# Patient Record
Sex: MALE | Race: WHITE | HISPANIC OR LATINO | Employment: FULL TIME | URBAN - METROPOLITAN AREA
[De-identification: names, ages, dates, MRNs, and addresses within clinical notes are randomized per-mention and may not be internally consistent; named-entity substitution may affect disease eponyms.]

---

## 2019-02-08 ENCOUNTER — HOSPITAL ENCOUNTER (EMERGENCY)
Facility: CLINIC | Age: 74
Discharge: HOME | End: 2019-02-08
Attending: EMERGENCY MEDICINE | Admitting: EMERGENCY MEDICINE
Payer: MEDICARE

## 2019-02-08 VITALS
DIASTOLIC BLOOD PRESSURE: 78 MMHG | HEART RATE: 68 BPM | RESPIRATION RATE: 16 BRPM | SYSTOLIC BLOOD PRESSURE: 124 MMHG | OXYGEN SATURATION: 97 % | TEMPERATURE: 97.6 F

## 2019-02-08 DIAGNOSIS — R21 RASH: ICD-10-CM

## 2019-02-08 PROCEDURE — 99282 EMERGENCY DEPT VISIT SF MDM: CPT

## 2019-02-08 RX ORDER — CETIRIZINE HYDROCHLORIDE 10 MG/1
10 TABLET ORAL
COMMUNITY
Start: 2018-12-13

## 2019-02-08 RX ORDER — MUPIROCIN 20 MG/G
OINTMENT TOPICAL 3 TIMES DAILY
Qty: 1 G | Refills: 0 | Status: SHIPPED | OUTPATIENT
Start: 2019-02-08 | End: 2019-02-15

## 2019-02-08 RX ORDER — DIPHENHYDRAMINE HCL 25 MG
25 CAPSULE ORAL EVERY 6 HOURS PRN
Qty: 20 CAPSULE | Refills: 0 | Status: SHIPPED | OUTPATIENT
Start: 2019-02-08 | End: 2019-03-10

## 2019-02-08 RX ORDER — ATORVASTATIN CALCIUM 20 MG/1
20 TABLET, FILM COATED ORAL
COMMUNITY
Start: 2018-08-09 | End: 2019-08-09

## 2019-02-08 ASSESSMENT — ENCOUNTER SYMPTOMS: FEVER: 1

## 2019-02-08 NOTE — ED PROVIDER NOTES
History     Chief Complaint:  Rash    HPI   Buddy Valenzuela is a 73 year old male who presents with rash. The patient states that he has a rash on his face with itching that started Monday. He states that the rash has been spreading more quickly since yesterday. The patient states that the rash is bilateral and are little spots. He states that since Monday more little spots have been popping up on his face. He states that the rash is not anywhere else on his body. The patient states that yesterday he tried to clean his face with a new soap and lotion. He states that the lotion and soap did not make the rash worse. He states he put alcohol on his face this morning to clean it off. The patient denies any new medication. The patient denies any injections when he went to the dentist. The patient denies any drainage of fluid from the rash. The patient also notes he had a low grade fever this morning though never took his temperature. The patient denies tongue or lip swelling. The patient states that his son whom he lives with does not have a rash.     Allergies:  No Known Allergies     Medications:    Finasteride     Past Medical History:    BPH  Gallstone     Past Surgical History:    Appendectomy  Hernia repair  Laparoscopic cholecystectomy     Family History:    The patient denies any relevant family history, including a family history of early onset heart disease, PE, or DVT.    Social History:  Smoking Status: former smoker  Alcohol Use: No  Marital Status:        Review of Systems   Constitutional: Positive for fever.   HENT:        No tongue or lip swelling   Skin: Positive for rash (no fluid drainage).   All other systems reviewed and are negative.    Physical Exam     Patient Vitals for the past 24 hrs:   BP Temp Temp src Pulse Resp SpO2   02/08/19 0911 (!) 163/92 97.6  F (36.4  C) Oral 73 16 96 %       Physical Exam    General: The patient appears comfortable sitting on bed  Head:  The scalp,  face, and head appear normal  Eyes:  The pupils are equal and round    Conjunctivae and sclerae are normal  ENT:    Several erythematous papules on bilateral cheeks, no drainage, skin appears slightly dry, no intra-oral lesions  Neck:  Normal range of motion  CV:  Regular rate and underlying rhythm     Normal S1 and S2  Resp:  Non labored breathing on room air  MS:  Moving all extremities equally  Skin:  See HEENT exam above  Neuro: Speech is normal and fluent    Awake and alert    Face symmetric    Gait stable  Emergency Department Course   Emergency Department Course:    0908 Nursing notes and vitals reviewed.    0911 I performed an exam of the patient as documented above. I personally answered all related questions prior to discharge    Impression & Plan      Medical Decision Making:  Buddy Valenzuela is a 73 year old male presents today with concern of a rash.  The rash is blanching and non-petechial, non-pruritic.  The patient is nontoxic and well-appearing.  I suspect this is a rash may be a dermatitis, eczema given history of eczema per chart review but will treat as possible folliculitis as well with topical mupirocin. Can use benadryl for itching. Less consistent with hives. No evidence of anaphylaxis. Symptomatic care is recommended as needed per discharge instructions.  Close follow-up with the patient's PMD was recommended.  I also recommended return if any worsening, high fever, sores in the mouth, difficulty breathing or any worsening.      Diagnosis:    ICD-10-CM    1. Rash R21         Disposition:   The patient is discharged to home.    Discharge Medications:     START taking      Dose / Directions   diphenhydrAMINE 25 MG capsule  Commonly known as:  BENADRYL      Dose:  25 mg  Take 1 capsule (25 mg) by mouth every 6 hours as needed for itching or allergies  Quantity:  20 capsule  Refills:  0     mupirocin 2 % external ointment  Commonly known as:  BACTROBAN      Apply topically 3 times daily for  7 days  Quantity:  1 g  Refills:  0           Where to get your medicines      Some of these will need a paper prescription and others can be bought over the counter. Ask your nurse if you have questions.    Bring a paper prescription for each of these medications    diphenhydrAMINE 25 MG capsule    mupirocin 2 % external ointment         Scribe Disclosure:  I, Amiwilliam Thibodeaux, am serving as a scribe at 9:11 AM on 2/8/2019 to document services personally performed by Shayy Goode MD based on my observations and the provider's statements to me.  [facility] EMERGENCY DEPARTMENT       Shayy Goode MD  02/08/19 2801

## 2019-02-08 NOTE — ED AVS SNAPSHOT
[facility] Department  [address]  [facility] [address]  Phone:  [phone]  Fax:  [phone]                                    Buddy Valenzuela   MRN: [id number]    Department:  [facility] Emergency Department   Date of Visit:  2/8/2019           After Visit Summary Signature Page    I have received my discharge instructions, and my questions have been answered. I have discussed any challenges I see with this plan with the nurse or doctor.    ..........................................................................................................................................  Patient/Patient Representative Signature      ..........................................................................................................................................  Patient Representative Print Name and Relationship to Patient    ..................................................               ................................................  Date                                   Time    ..........................................................................................................................................  Reviewed by Signature/Title    ...................................................              ..............................................  Date                                               Time          22EPIC Rev 08/18

## 2019-02-08 NOTE — DISCHARGE INSTRUCTIONS
Use benadryl for itching  Use the mupirocin which is an antibiotic cream on cheeks  Follow up with primary care provider if not improving

## 2019-09-10 ENCOUNTER — HOSPITAL ENCOUNTER (EMERGENCY)
Facility: CLINIC | Age: 74
End: 2019-09-10
Payer: MEDICARE

## 2020-01-27 ENCOUNTER — APPOINTMENT (OUTPATIENT)
Dept: INTERPRETER SERVICES | Facility: CLINIC | Age: 75
End: 2020-01-27
Payer: MEDICARE

## 2023-08-01 ENCOUNTER — APPOINTMENT (OUTPATIENT)
Dept: GENERAL RADIOLOGY | Facility: CLINIC | Age: 78
End: 2023-08-01
Attending: EMERGENCY MEDICINE
Payer: COMMERCIAL

## 2023-08-01 ENCOUNTER — HOSPITAL ENCOUNTER (EMERGENCY)
Facility: CLINIC | Age: 78
Discharge: HOME | End: 2023-08-02
Attending: EMERGENCY MEDICINE | Admitting: EMERGENCY MEDICINE
Payer: COMMERCIAL

## 2023-08-01 DIAGNOSIS — R10.13 EPIGASTRIC PAIN: ICD-10-CM

## 2023-08-01 DIAGNOSIS — K29.00 ACUTE GASTRITIS WITHOUT HEMORRHAGE, UNSPECIFIED GASTRITIS TYPE: ICD-10-CM

## 2023-08-01 DIAGNOSIS — K59.00 CONSTIPATION, UNSPECIFIED CONSTIPATION TYPE: ICD-10-CM

## 2023-08-01 DIAGNOSIS — M72.2 PLANTAR FASCIITIS: ICD-10-CM

## 2023-08-01 LAB
ALBUMIN SERPL BCG-MCNC: 4 G/DL (ref 3.5–5.2)
ALBUMIN UR-MCNC: 100 MG/DL
ALP SERPL-CCNC: 87 U/L (ref 40–129)
ALT SERPL W P-5'-P-CCNC: 58 U/L (ref 0–70)
ANION GAP SERPL CALCULATED.3IONS-SCNC: 10 MMOL/L (ref 7–15)
APPEARANCE UR: CLEAR
AST SERPL W P-5'-P-CCNC: 65 U/L (ref 0–45)
BASOPHILS # BLD AUTO: 0.2 10E3/UL (ref 0–0.2)
BASOPHILS NFR BLD AUTO: 2 %
BILIRUB SERPL-MCNC: 0.4 MG/DL
BILIRUB UR QL STRIP: NEGATIVE
BUN SERPL-MCNC: 21.1 MG/DL (ref 8–23)
CALCIUM SERPL-MCNC: 9.1 MG/DL (ref 8.8–10.2)
CHLORIDE SERPL-SCNC: 104 MMOL/L (ref 98–107)
COLOR UR AUTO: YELLOW
CREAT SERPL-MCNC: 1.42 MG/DL (ref 0.67–1.17)
DEPRECATED HCO3 PLAS-SCNC: 26 MMOL/L (ref 22–29)
EOSINOPHIL # BLD AUTO: 0.6 10E3/UL (ref 0–0.7)
EOSINOPHIL NFR BLD AUTO: 4 %
ERYTHROCYTE [DISTWIDTH] IN BLOOD BY AUTOMATED COUNT: 20.9 % (ref 10–15)
GFR SERPL CREATININE-BSD FRML MDRD: 51 ML/MIN/1.73M2
GLUCOSE SERPL-MCNC: 95 MG/DL (ref 70–99)
GLUCOSE UR STRIP-MCNC: NEGATIVE MG/DL
HCT VFR BLD AUTO: 48.6 % (ref 40–53)
HGB BLD-MCNC: 14.6 G/DL (ref 13.3–17.7)
HGB UR QL STRIP: ABNORMAL
HOLD SPECIMEN: NORMAL
HOLD SPECIMEN: NORMAL
IMM GRANULOCYTES # BLD: 0.1 10E3/UL
IMM GRANULOCYTES NFR BLD: 1 %
KETONES UR STRIP-MCNC: NEGATIVE MG/DL
LEUKOCYTE ESTERASE UR QL STRIP: ABNORMAL
LIPASE SERPL-CCNC: 60 U/L (ref 13–60)
LYMPHOCYTES # BLD AUTO: 1.7 10E3/UL (ref 0.8–5.3)
LYMPHOCYTES NFR BLD AUTO: 11 %
MCH RBC QN AUTO: 22.9 PG (ref 26.5–33)
MCHC RBC AUTO-ENTMCNC: 30 G/DL (ref 31.5–36.5)
MCV RBC AUTO: 76 FL (ref 78–100)
MONOCYTES # BLD AUTO: 1 10E3/UL (ref 0–1.3)
MONOCYTES NFR BLD AUTO: 7 %
MUCOUS THREADS #/AREA URNS LPF: PRESENT /LPF
NEUTROPHILS # BLD AUTO: 11 10E3/UL (ref 1.6–8.3)
NEUTROPHILS NFR BLD AUTO: 75 %
NITRATE UR QL: NEGATIVE
NRBC # BLD AUTO: 0 10E3/UL
NRBC BLD AUTO-RTO: 0 /100
PH UR STRIP: 5.5 [PH] (ref 5–7)
PLATELET # BLD AUTO: 384 10E3/UL (ref 150–450)
POTASSIUM SERPL-SCNC: 5 MMOL/L (ref 3.4–5.3)
PROT SERPL-MCNC: 7.4 G/DL (ref 6.4–8.3)
RBC # BLD AUTO: 6.38 10E6/UL (ref 4.4–5.9)
RBC URINE: 8 /HPF
SODIUM SERPL-SCNC: 140 MMOL/L (ref 136–145)
SP GR UR STRIP: 1.02 (ref 1–1.03)
UROBILINOGEN UR STRIP-MCNC: NORMAL MG/DL
WBC # BLD AUTO: 14.6 10E3/UL (ref 4–11)
WBC URINE: 9 /HPF

## 2023-08-01 PROCEDURE — 271N000002 HC RX 271: Performed by: EMERGENCY MEDICINE

## 2023-08-01 PROCEDURE — 74019 RADEX ABDOMEN 2 VIEWS: CPT

## 2023-08-01 PROCEDURE — 250N000009 HC RX 250: Performed by: EMERGENCY MEDICINE

## 2023-08-01 PROCEDURE — 36415 COLL VENOUS BLD VENIPUNCTURE: CPT | Performed by: EMERGENCY MEDICINE

## 2023-08-01 PROCEDURE — 99284 EMERGENCY DEPT VISIT MOD MDM: CPT

## 2023-08-01 PROCEDURE — 85025 COMPLETE CBC W/AUTO DIFF WBC: CPT | Performed by: EMERGENCY MEDICINE

## 2023-08-01 PROCEDURE — 83690 ASSAY OF LIPASE: CPT | Performed by: EMERGENCY MEDICINE

## 2023-08-01 PROCEDURE — 250N000013 HC RX MED GY IP 250 OP 250 PS 637: Performed by: EMERGENCY MEDICINE

## 2023-08-01 PROCEDURE — 72100 X-RAY EXAM L-S SPINE 2/3 VWS: CPT

## 2023-08-01 PROCEDURE — 87086 URINE CULTURE/COLONY COUNT: CPT | Performed by: EMERGENCY MEDICINE

## 2023-08-01 PROCEDURE — 81001 URINALYSIS AUTO W/SCOPE: CPT | Performed by: EMERGENCY MEDICINE

## 2023-08-01 PROCEDURE — 80053 COMPREHEN METABOLIC PANEL: CPT | Performed by: EMERGENCY MEDICINE

## 2023-08-01 RX ORDER — LIDOCAINE HYDROCHLORIDE 20 MG/ML
15 SOLUTION OROPHARYNGEAL ONCE
Status: COMPLETED | OUTPATIENT
Start: 2023-08-01 | End: 2023-08-01

## 2023-08-01 RX ORDER — POLYETHYLENE GLYCOL 3350 17 G/17G
1 POWDER, FOR SOLUTION ORAL DAILY
Qty: 527 G | Refills: 0 | Status: SHIPPED | OUTPATIENT
Start: 2023-08-01 | End: 2023-08-31

## 2023-08-01 RX ORDER — MAGNESIUM HYDROXIDE/ALUMINUM HYDROXICE/SIMETHICONE 120; 1200; 1200 MG/30ML; MG/30ML; MG/30ML
15 SUSPENSION ORAL ONCE
Status: COMPLETED | OUTPATIENT
Start: 2023-08-01 | End: 2023-08-01

## 2023-08-01 RX ADMIN — LIDOCAINE HYDROCHLORIDE 15 ML: 1 POWDER at 21:39

## 2023-08-01 RX ADMIN — ALUMINUM HYDROXIDE, MAGNESIUM HYDROXIDE, AND DIMETHICONE 15 ML: 200; 20; 200 SUSPENSION ORAL at 21:38

## 2023-08-01 ASSESSMENT — ACTIVITIES OF DAILY LIVING (ADL)
ADLS_ACUITY_SCORE: 35
ADLS_ACUITY_SCORE: 33

## 2023-08-01 NOTE — ED TRIAGE NOTES
Patient reports he is having back pain. Patient states he feels as if he can't put his feet down flat as he has pain  behind his knees, patient has pain in both feet worse on the left.  Patient reports pain on the right lower part of his back that radiates into his upper back and right shoulder.

## 2023-08-02 VITALS — DIASTOLIC BLOOD PRESSURE: 71 MMHG | OXYGEN SATURATION: 97 % | SYSTOLIC BLOOD PRESSURE: 144 MMHG | HEART RATE: 60 BPM

## 2023-08-02 NOTE — ED NOTES
Rapid Assessment Note    History:   Buddy Valenzuela is a 77 year old male who presents with back pain. The patient has had worsening back pain today, along with foot pain in his soles. He has abdominal pain as well. He started to have the pain originally about two months ago. He was seen on 07/01 at urgent care, and was discharged with no major findings. The pain starts in his right lower back, but the pain radiates up to his upper back. The pain also radiates up from his foot to his back. He denies both urinary incontinence and bowel incontinence. He denies chest pain. He denies falls or injury to his back.    Exam:   General:  Alert, interactive  Cardiovascular:  Well perfused  Lungs:  No respiratory distress  Abdomen: Mild epigastric tenderness  Back: Reproducible L-spine tenderness  Neuro:  Moving all 4 extremities  Skin:  Warm, dry  Psych:  Normal affect  Extremities: Tenderness bilaterally along plantar fascia with insertion calcaneus, no obvious deformities, swelling or erythema    Plan of Care:   I evaluated the patient and developed an initial plan of care. I discussed this plan and explained that I, or one of my partners, would be returning to complete the evaluation.         I, Marisol Hired, am serving as a scribe to document services personally performed by Gurpreet Rosen MD based on my observations and the provider's statements to me.    8/1/2023  [employer]    Portions of this medical record were completed by a scribe. UPON MY REVIEW AND AUTHENTICATION BY ELECTRONIC SIGNATURE, this confirms (a) I performed the applicable clinical services, and (b) the record is accurate.        Gurpreet Rosen MD  08/01/23 2059

## 2023-08-02 NOTE — DISCHARGE INSTRUCTIONS
Please begin medications with the pharmacy and take them as prescribed.    Please follow-up with your primary care physician for reevaluation for persistent pain.    After the cleanout tomorrow, please continue to take MiraLAX daily to help avoid constipation in the future.    You will likely be contacted to arrange a podiatry follow-up.

## 2023-08-02 NOTE — ED PROVIDER NOTES
History     Chief Complaint:  Back Pain       The history is limited by a language barrier. A  was used (Chadian).      Buddy Valenzuela is a 77 year old male who presents with back pain. The patient has had worsening back pain today, along with foot pain in his soles. He has abdominal pain as well. He started to have the pain originally about two months ago. He was seen on 07/01 at urgent care, and was discharged with no major findings. The pain starts in his right lower back, but the pain radiates up to his upper back. The pain also radiates up from his foot to his back. He denies both urinary incontinence and bowel incontinence. He denies chest pain. He denies falls or injury to his back.     Independent Historian:    Son    Review of External Notes:  See MDM    Medications:    atorvastatin   cetirizine   finasteride   tamsulosin    Past Medical History:    BPH  Gallstones    Past Surgical History:    Appendectomy  Inguinal hernia repair  Cholecystectomy     Physical Exam   Patient Vitals for the past 24 hrs:   BP Temp Temp src Pulse Resp SpO2   08/01/23 2359 -- -- -- -- -- 97 %   08/01/23 2358 -- -- -- -- -- 96 %   08/01/23 2357 -- -- -- -- -- 98 %   08/01/23 2356 (!) 144/71 -- -- 60 -- 95 %   08/01/23 2308 -- -- -- -- -- 98 %   08/01/23 2307 -- -- -- -- -- 99 %   08/01/23 2306 -- -- -- -- -- 98 %   08/01/23 2305 -- -- -- -- -- 98 %   08/01/23 2304 -- -- -- -- -- 98 %   08/01/23 2303 -- -- -- -- -- 98 %   08/01/23 2302 -- -- -- -- -- 97 %   08/01/23 2301 138/78 -- -- 63 -- --   08/01/23 1857 (P) 120/85 (P) 98.1  F (36.7  C) (P) Oral (P) 78 (P) 18 (P) 100 %        Physical Exam  Constitutional: Well developed, nontox appearance  Head: Atraumatic.   Mouth/Throat: Oropharynx is clear and moist.   Neck:  no stridor  Eyes: no scleral icterus  Cardiovascular: RRR, 2+ bilat radial pulses  Pulmonary/Chest: nml resp effort  Abdominal: ND, soft, epigastric and RLQ, no rebound or guarding    Back: Reproducible L-spine tenderness  : no CVA tenderness bilat  Ext: Warm, well perfused, no edema; Tenderness bilaterally along plantar fascia with insertion calcaneus, no obvious deformities, swelling or erythema  Neurological: A&O, symmetric facies, moves ext x4  Skin: Skin is warm and dry.   Psychiatric: Behavior is normal. Thought content normal.   Nursing note and vitals reviewed.     Emergency Department Course   Imaging:  Lumbar spine XR, 2-3 views   Final Result   IMPRESSION: There are 5 lumbar type vertebral bodies. The vertebral body heights are well-maintained throughout. There is a slight dextroscoliosis of the mid lumbar spine. There is a mild retrolisthesis of L1 in relation to L2. The rest of the lumbar    spine has good anatomic alignment. Is diffuse degenerative disc disease throughout the lumbar disc spaces with a moderate loss of height, endplate changes and prominent bridging anterior lateral osteophyte formations.      Abdomen XR, 2 vw, flat and upright   Final Result   IMPRESSION: Upright and supine views of the abdomen and pelvis were obtained. Moderate amount of stool throughout the colon, otherwise nonobstructive bowel gas pattern. No free peritoneal or portal venous gas. Right upper quadrant postcholecystectomy    clips.        Report per radiology    Laboratory:  Labs Ordered and Resulted from Time of ED Arrival to Time of ED Departure   COMPREHENSIVE METABOLIC PANEL - Abnormal       Result Value    Sodium 140      Potassium 5.0      Chloride 104      Carbon Dioxide (CO2) 26      Anion Gap 10      Urea Nitrogen 21.1      Creatinine 1.42 (*)     Calcium 9.1      Glucose 95      Alkaline Phosphatase 87      AST 65 (*)     ALT 58      Protein Total 7.4      Albumin 4.0      Bilirubin Total 0.4      GFR Estimate 51 (*)    ROUTINE UA WITH MICROSCOPIC REFLEX TO CULTURE - Abnormal    Color Urine Yellow      Appearance Urine Clear      Glucose Urine Negative      Bilirubin Urine Negative       Ketones Urine Negative      Specific Gravity Urine 1.019      Blood Urine Small (*)     pH Urine 5.5      Protein Albumin Urine 100 (*)     Urobilinogen Urine Normal      Nitrite Urine Negative      Leukocyte Esterase Urine Trace (*)     Mucus Urine Present (*)     RBC Urine 8 (*)     WBC Urine 9 (*)    CBC WITH PLATELETS AND DIFFERENTIAL - Abnormal    WBC Count 14.6 (*)     RBC Count 6.38 (*)     Hemoglobin 14.6      Hematocrit 48.6      MCV 76 (*)     MCH 22.9 (*)     MCHC 30.0 (*)     RDW 20.9 (*)     Platelet Count 384      % Neutrophils 75      % Lymphocytes 11      % Monocytes 7      % Eosinophils 4      % Basophils 2      % Immature Granulocytes 1      NRBCs per 100 WBC 0      Absolute Neutrophils 11.0 (*)     Absolute Lymphocytes 1.7      Absolute Monocytes 1.0      Absolute Eosinophils 0.6      Absolute Basophils 0.2      Absolute Immature Granulocytes 0.1      Absolute NRBCs 0.0     LIPASE - Normal    Lipase 60     URINE CULTURE      Emergency Department Course & Assessments:    Interventions:  Medications   lidocaine (viscous) (XYLOCAINE) 2 % solution 15 mL (15 mLs Mouth/Throat $Given 23)   alum & mag hydroxide-simethicone (MAALOX) suspension 15 mL (15 mLs Oral $Given 23)      Assessments:  2310 I rechecked the patient and explained findings.  2325 I rechecked the patient.     Independent Interpretation (X-rays, CTs, rhythm strip):  See MDM    Social Determinants of Health affecting care:  See MDM    Disposition:  The patient was discharged to home.     Impression & Plan    Medical Decision Makin year old male presenting w/ abdominal pain for months     Social determinants affecting patient's health include: Age potentially increasing risk for presentation to the emergency department     I reviewed medical records from previous CT from 7/3/2023, office visit from 7/3/2023 for similar symptoms     DDx includes acute on chronic abdominal pain, constipation, UTI, prostatitis  eliciting less likely, GERD, peptic ulcer disease, gastritis.  Doubt ACS, vascular catastrophe given history and physical exam in context of previous imaging studies.  Labs significant for equivocal UA although the patient reports no new urinary symptoms and that he is currently taking antibiotics; urine culture pending.  Leukocytosis mildly increased from previous.  Abdominal x-ray without evidence of obstruction, noticeable stool content consistent with constipation on my independent interpretation.  Patient reports that he has stool softeners at home including colonoscopy prep which he plans on starting tomorrow.  GI cocktail improved the patient's symptoms somewhat in the emergency department.  There may be an element of gastritis.  Prescriptions provided as noted below the patient was encouraged to continue take his PPI.  Podiatry referral placed prior to discharge given history and exam seems consistent with plantar fasciitis.  Given age, screening x-rays of the lumbar spine were obtained and demonstrated no significant emergent abnormality.  At this time I feel the pt is safe for discharge.  Recommendations given regarding follow up with PCP and return to the emergency department as needed for new or worsening symptoms.  Pt and family counseled on all results, disposition and diagnosis.  They are understanding and agreeable to plan. Patient discharged in stable condition.     Diagnosis:    ICD-10-CM    1. Epigastric pain  R10.13       2. Constipation, unspecified constipation type  K59.00       3. Acute gastritis without hemorrhage, unspecified gastritis type  K29.00       4. Plantar fasciitis  M72.2 Orthopedic  Referral           Discharge Medications:  Discharge Medication List as of 8/1/2023 11:53 PM        START taking these medications    Details   COMPOUNDED NON-CONTROLLED SUBSTANCE (CMPD RX) - PHARMACY TO MIX COMPOUNDED MEDICATION Please compound viscous lidocaine 2% and maalox in a 1:1 ratio  Please take 15 to 30 ml by mouth every 4-6 hours as needed for abdominal pain, Disp-500 mL, R-0, E-Prescribe      polyethylene glycol (MIRALAX) 17 GM/Dose powder Take 17 g (1 Capful) by mouth daily for 30 days, Disp-527 g, R-0, E-Prescribe            Scribe Disclosure:  Marisol MARTINed, am serving as a scribe at 10:14 PM on 8/1/2023 to document services personally performed by Gurpreet Rosen MD based on my observations and the provider's statements to me.  8/1/2023   Gurpreet Rosen MD Vaughn, Christopher E, MD  08/02/23 0255

## 2023-08-03 ENCOUNTER — OFFICE VISIT (OUTPATIENT)
Dept: PODIATRY | Facility: CLINIC | Age: 78
End: 2023-08-03
Payer: COMMERCIAL

## 2023-08-03 VITALS — WEIGHT: 146 LBS | SYSTOLIC BLOOD PRESSURE: 118 MMHG | DIASTOLIC BLOOD PRESSURE: 66 MMHG

## 2023-08-03 DIAGNOSIS — M72.2 PLANTAR FASCIITIS: ICD-10-CM

## 2023-08-03 LAB — BACTERIA UR CULT: NORMAL

## 2023-08-03 PROCEDURE — 99203 OFFICE O/P NEW LOW 30 MIN: CPT | Performed by: PODIATRIST

## 2023-08-03 RX ORDER — MELOXICAM 7.5 MG/1
7.5 TABLET ORAL DAILY
Qty: 10 TABLET | Refills: 0 | Status: SHIPPED | OUTPATIENT
Start: 2023-08-03 | End: 2023-08-13

## 2023-08-03 ASSESSMENT — PAIN SCALES - GENERAL: PAINLEVEL: WORST PAIN (10)

## 2023-08-03 NOTE — PATIENT INSTRUCTIONS
Thank you for choosing [facility] Podiatry / Foot & Ankle Surgery!    DR. WILSON'S CLINIC LOCATIONS:     [facility] (Friday) TRIAGE LINE: [phone] [address] [facility]  APPOINTMENTS: [phone]   [place] [address] RADIOLOGY: [phone]    PHYSICAL THERAPY: [phone]    SET UP SURGERY: [phone]   [place] (Mon-Tues AM-Thurs) BILLING QUESTIONS: [phone]   [address]  [address] FAX: [phone]   [place] [address]       PLANTAR FASCIITIS   What is plantar fasciitis?   Plantar fasciitis is often referred to as heel spurs or heel pain. Plantar fasciitis is a very common problem that affects people of all foot shapes, age, weight and activity level. Pain may be in the arch or on the weight-bearing surface of the heel. The pain maycome on without injury or identifiable cause. Pain is generally present when first getting out of bed in the morning or up from a seated break.   What causes plantar fasciitis?   The plantar fascia is a dense fibrous band of tissue that stretches across the bottom surface of the foot. The fascia helps support the foot muscles and arch. Plantar fasciitis is thought to be caused by mechanical strain or overload. Frequent walking without shoes or wearing unsupportive shoes is thought to cause structural overload and ultimately inflammation of the plantar fascia. Some people have heel spurs that can be seen on x-ray. The heel spur is actually a minor component of plantar fascitis and is largely ignored.   How long will this last?   Plantar fasciitis can last from one day to a lifetime. Some people get intermittent fascitis that is very short-lived. Others suffer daily for years. Excessive body weight, frequent bare foot walking, long hours on the feet, inadequate shoes, predisposing foot structures and excessive activity such as running are all potential issues that lead to chronic and/or recurring plantar fascitis. Having plantar fasciitis means that  you are forever prone to this problem and will require modification of some of the above factors. Most people seek treatment within one to four months. Healing usually requires a similar one to four month time frame. Healing time is relative to the amount of effort spent treating the problem.   What can I do?   The easiest solution is to stop walking around your home without shoes. Plantar fasciitis is largely a shoe problem. Shoes are either not being worn often enough or your current shoes are inadequate for your weight, foot structure or activity level. The majority of shoes on the market today are not sufficient to resist development of plantar fasciitis or to promote healing. Assume that your current shoes are inadequate and will need to be replaced. Even high quality shoes wear out with 6 months to one year of frequent use. Weightloss is another option. Losing ten pounds in the next two months may be enough to resolve the problem. Ice applied to the area of pain two to three times per day for ten minutes each session can be very helpful. This should continue until the problem resolves. Achilles tendon stretching is essential. Stretch multiple times daily to promote healing and to prevent recurrence in the future.     What if this does not help?   Medical treatments often include custom arch supports, cortisone injections, physical therapy, splints to be worn in bed, prescription medications and surgery. The home treatments listed above will be necessary regardless of these advanced medical treatments. Surgery is rarely needed but is very helpful in selected cases.     Heel pain in my future?   Plantar fasciitis is highly recurrent. Risk factors often continue, including return to bare foot walking, inadequate shoes, excessive body weight, excessive activities, etc. Your life style and foot structure may predispose you to recurrent plantar fasciitis. A daily prevention regimen can be very helpful. Ongoing use  of shoe inserts, careful attention to appropriate shoes, daily Achilles stretching, etc. may prevent recurrence. Prompt attention at the earliest warning signs of heel pain can resolve the problem in as short as a few days.   Below are some exercises for Plantar fasciitis:  Stair exercise  You can step on the stairs with the ball of your foot and hold your position for at least 15 seconds, then slowly step down with the heels of your foot. You can do this daily and as often as you want. Plantar fasciitis exercise equipment and handout materials are useful in relieving pain.  Picking the towel  Plantar fasciitis exercise equipment and handout teach you how to exercise by picking the towel. You can sit comfortably and then pick the towel with your toes. Do this at least 10 to 20 times regularly. You can use any object other than a towel as long as the material can be soft and you can pick it up with your toes.  Rolling the bottle or ball  You can get a small ball or bottle and then roll it with your foot. Do this daily for at least 15 to 20 times. Plantar fasciitis exercise equipment and handout are very useful in treating the symptoms of the foot condition.  Stretching the calf  You could lie supine, raise one foot, and then point your toes towards the floor. Do this daily for at least 15 to 20 times. The calf is connected to the heel and the balls of the foot, so you should also exercise this also. Plantar fasciitis exercise equipment and handout usually mentions the importance of exercising the calf also.  Flex the toes  Sit comfortably and then flex your toes by pointing it towards the floor or towards your body. This will relax and flex your foot and exercise your plantar fascia, the calf, and the Achilles tendon. The inability of the foot to stretch often causes the bunching up of the plantar fascia area leading to the pain.  Massaging the calf and the plantar fascia also helps a lot in alleviating the pain and  "preventing its recurrence. If you prefer standard plantar fasciitis exercise equipment and handout, then you can avail of this from legitimate sources. You can use the standard stretching device, the wheel, and the belt. These are all significant devices in treating the pain in the plantar fascia.    Therapies covered by Dr Carrero today:    1.  Supportive shoes, minimizing barefoot ambulation - helps to provide cushion, padding and support to the ligament that is inflamed.  Socks, flip flops, flats and some slippers are not typically sufficient to provide support.  Shoes should be worn even in-doors  2.  Insert/orthotics - inserts/orthotics that have an arch support built in to them provide further stress relief for the ligament.  3. Icing - using a frozen water bottle or orange, and rolling it along the bottom of the arch/heel can help to alleviate discomfort, and can act as a tissue massage to the painful, inflamed ligament.  There is evidence that shows icing at least three times daily can be beneficial  4.  Anti-inflammatory(NSAIDS)/Tylenol - anti-inflammatories, such as ibuprofen or Aleve, as well as Tylenol can be used to help decrease symptoms and improve pain levels.  If you have high blood pressure, heart disease, stomach or kidney problems, use anti-inflammatories sparingly.  Tylenol should not be used if you have liver problems.  If you can safely taken them, you can use NSAIDS and tylenol in combination for pain relief  5. Activity modifications - if there are certain things that you do, whether it's going barefoot or certain shoes/activities, you should try to minimize those activities as much as possible until your symptoms are sufficiently resolved.  Certainly, some activities, such as running on the treadmill, are easier to take a break from versus others, such as work or chores at home.  \"If there are certain activities that hurt your heel, and you keep doing those activities that hurt your " "heel, your heel will keep hurting\".    6.  Stretching - Stretching your Achilles and hamstring can help to decrease stress on the plantar fascia.                   Hold each stretch for 10 seconds.  Stretch 10 times per set, three sets per day.  Morning, afternoon and evening.  If your heel pain is very severe in the morning, consider doing the first set of stretches before you get out of bed.            If these initial therapies are insufficient, we have our tier 2 therapies that can more aggressively work to improve your symptoms and get you back to the activities that you enjoy.        OVER THE COUNTER INSERTS    Most of these can be found at your local [employer], [employer], or online:    Mobile Cardt   Sofsole Fit [place]   Power Step   Walk-Fit ([employer])  *For heel pain* Arch Cradles  *For heel pain*       ** A good high quality over the counter insert should cost around $40-$50    ** Capsulitis/Metarasalgia - try adding a metatarsal pad on your inserts or find an insert with one built in       "

## 2023-08-03 NOTE — PROGRESS NOTES
Foot & Ankle Surgery  August 3, 2023    CC: Bilateral arch pain    I was asked to see Buddy Valenzuela regarding the chief complaint by: Emergency department    HPI:  Pt is a 77 year old male who presents with above complaint.  The patient was seen in the emergency department on 8/1/2023 for worsening low back pain, abdominal pain and arch pain.  He had imaging of the lumbar spine.  He points to the plantar arch of both feet.  No specific treatment up to this point.    ROS:   Pos for CC.  The patient denies current nausea, vomiting, chills, fevers, belly pain, calf pain, chest pain or SOB.  Complete remainder of ROS is otherwise neg.    VITALS:  There were no vitals filed for this visit.    PMH:    Past Medical History:   Diagnosis Date    BPH (benign prostatic hyperplasia)     Gallstones        SXHX:    Past Surgical History:   Procedure Laterality Date    APPENDECTOMY      HERNIA REPAIR, INGUINAL RT/LT      bilateral    LAPAROSCOPIC CHOLECYSTECTOMY  6/6/2012    Procedure:LAPAROSCOPIC CHOLECYSTECTOMY; LAPAROSCOPIC CHOLECYSTECTOMY; Surgeon:CHICHO BOYD; Location: OR        MEDS:    Current Outpatient Medications   Medication    atorvastatin (LIPITOR) 20 MG tablet    cetirizine (ZYRTEC) 10 MG tablet    COMPOUNDED NON-CONTROLLED SUBSTANCE (CMPD RX) - PHARMACY TO MIX COMPOUNDED MEDICATION    finasteride (PROSCAR) 5 MG tablet    polyethylene glycol (MIRALAX) 17 GM/Dose powder    TAMSULOSIN HCL PO     No current facility-administered medications for this visit.       ALL:   No Known Allergies    FMH:  No family history on file.    SocHx:    Social History     Socioeconomic History    Marital status:      Spouse name: Not on file    Number of children: Not on file    Years of education: Not on file    Highest education level: Not on file   Occupational History    Not on file   Tobacco Use    Smoking status: Former    Smokeless tobacco: Never   Substance and Sexual Activity    Alcohol use: No    Drug use:  No    Sexual activity: Not on file   Other Topics Concern    Not on file   Social History Narrative    Not on file     Social Determinants of Health     Financial Resource Strain: Not on file   Food Insecurity: Not on file   Transportation Needs: Not on file   Physical Activity: Not on file   Stress: Not on file   Social Connections: Not on file   Intimate Partner Violence: Not on file   Housing Stability: Not on file           EXAMINATION:  Gen:   No apparent distress  Neuro:   A&Ox3, no deficits  Psych:    Answering questions appropriately for age and situation with normal affect  Head:    NCAT  Eye:    Visual scanning without deficit  Ear:    Response to auditory stimuli wnl  Lung:    Non-labored breathing on RA noted  Abd:    NTND per patient report  Lymph:    Neg for pitting/non-pitting edema BLE  Vasc:    Pulses palpable, CFT minimally delayed  Neuro:    Light touch sensation intact to all sensory nerve distributions without paresthesias  Derm:    Neg for nodules, lesions or ulcerations  MSK:    Bilateral lower extremity examination -he is tender on palpation of the central band of the plantar fascia at the midfoot.  No heel pain is seen.  No Baxters neuritis or tarsal tunnel syndromes pain is seen.  No abductor hallucis muscle belly or calcaneus pain is seen.  Calf:    Neg for redness, swelling or tenderness    Assessment:  77 year old male with bilateral arch pain/plantar fasciitis in setting of worsening lumbosacral spine pain      Medical Decision Making/Plan:  Discussed etiologies, anatomy and options  1.  Sudden onset bilateral arch pain/plantar fasciitis in setting of worsening lumbosacral spine pain  -I personally reviewed and interpreted the patient's lower extremity history pertinent to today's visit, including imaging/labs, in preparation for initiating a treatment program.  -Regarding the heel pain, the Plantar Fasciitis handout was dispensed and discussed.  We talked about stretching,  resting/activity modification, icing, NSAID/tylenol use as tolerated, inserts, supportive/comfortable shoes and minimizing shoeless walking.    -discussed Achilles, plantar fascial and hamstring stretches  -OTC insert information dispensed and discussed   -The patient was given a prescription for Mobic 7.5 mg daily x 10 days  -We discussed that his back may be the cause of pain.  However, we will address the arch pain and reassess in 2 weeks    Follow up: 2 weeks or sooner with acute issues      Patient's medical history was reviewed today      Ravindra Carrero DPM FACW. D. [facility] FACFAOM  Podiatric Foot & Ankle Surgeon  [facility]  [phone]    Disclaimer: This note consists of symbols derived from keyboarding, dictation and/or voice recognition software. As a result, there may be errors in the script that have gone undetected. Please consider this when interpreting information found in this chart.

## 2023-10-12 NOTE — LETTER
8/3/2023         RE: Buddy Valenzuela  [address] [place] [address]        Dear Colleague,    Thank you for referring your patient, Buddy Valenzuela, to the [facility] PODIATRY. Please see a copy of my visit note below.    Foot & Ankle Surgery  August 3, 2023    CC: Bilateral arch pain    I was asked to see Buddy Valenzuela regarding the chief complaint by: Emergency department    HPI:  Pt is a 77 year old male who presents with above complaint.  The patient was seen in the emergency department on 8/1/2023 for worsening low back pain, abdominal pain and arch pain.  He had imaging of the lumbar spine.  He points to the plantar arch of both feet.  No specific treatment up to this point.    ROS:   Pos for CC.  The patient denies current nausea, vomiting, chills, fevers, belly pain, calf pain, chest pain or SOB.  Complete remainder of ROS is otherwise neg.    VITALS:  There were no vitals filed for this visit.    PMH:    Past Medical History:   Diagnosis Date     BPH (benign prostatic hyperplasia)      Gallstones        SXHX:    Past Surgical History:   Procedure Laterality Date     APPENDECTOMY       HERNIA REPAIR, INGUINAL RT/LT      bilateral     LAPAROSCOPIC CHOLECYSTECTOMY  6/6/2012    Procedure:LAPAROSCOPIC CHOLECYSTECTOMY; LAPAROSCOPIC CHOLECYSTECTOMY; Surgeon:CHICHO BOYD; Location: OR        MEDS:    Current Outpatient Medications   Medication     atorvastatin (LIPITOR) 20 MG tablet     cetirizine (ZYRTEC) 10 MG tablet     COMPOUNDED NON-CONTROLLED SUBSTANCE (CMPD RX) - PHARMACY TO MIX COMPOUNDED MEDICATION     finasteride (PROSCAR) 5 MG tablet     polyethylene glycol (MIRALAX) 17 GM/Dose powder     TAMSULOSIN HCL PO     No current facility-administered medications for this visit.       ALL:   No Known Allergies    FMH:  No family history on file.    SocHx:    Social History     Socioeconomic History     Marital status:      Spouse name: Not on file      Number of children: Not on file     Years of education: Not on file     Highest education level: Not on file   Occupational History     Not on file   Tobacco Use     Smoking status: Former     Smokeless tobacco: Never   Substance and Sexual Activity     Alcohol use: No     Drug use: No     Sexual activity: Not on file   Other Topics Concern     Not on file   Social History Narrative     Not on file     Social Determinants of Health     Financial Resource Strain: Not on file   Food Insecurity: Not on file   Transportation Needs: Not on file   Physical Activity: Not on file   Stress: Not on file   Social Connections: Not on file   Intimate Partner Violence: Not on file   Housing Stability: Not on file           EXAMINATION:  Gen:   No apparent distress  Neuro:   A&Ox3, no deficits  Psych:    Answering questions appropriately for age and situation with normal affect  Head:    NCAT  Eye:    Visual scanning without deficit  Ear:    Response to auditory stimuli wnl  Lung:    Non-labored breathing on RA noted  Abd:    NTND per patient report  Lymph:    Neg for pitting/non-pitting edema BLE  Vasc:    Pulses palpable, CFT minimally delayed  Neuro:    Light touch sensation intact to all sensory nerve distributions without paresthesias  Derm:    Neg for nodules, lesions or ulcerations  MSK:    Bilateral lower extremity examination -he is tender on palpation of the central band of the plantar fascia at the midfoot.  No heel pain is seen.  No Baxters neuritis or tarsal tunnel syndromes pain is seen.  No abductor hallucis muscle belly or calcaneus pain is seen.  Calf:    Neg for redness, swelling or tenderness    Assessment:  77 year old male with bilateral arch pain/plantar fasciitis in setting of worsening lumbosacral spine pain      Medical Decision Making/Plan:  Discussed etiologies, anatomy and options  1.  Sudden onset bilateral arch pain/plantar fasciitis in setting of worsening lumbosacral spine pain  -I personally  reviewed and interpreted the patient's lower extremity history pertinent to today's visit, including imaging/labs, in preparation for initiating a treatment program.  -Regarding the heel pain, the Plantar Fasciitis handout was dispensed and discussed.  We talked about stretching, resting/activity modification, icing, NSAID/tylenol use as tolerated, inserts, supportive/comfortable shoes and minimizing shoeless walking.    -discussed Achilles, plantar fascial and hamstring stretches  -OTC insert information dispensed and discussed   -The patient was given a prescription for Mobic 7.5 mg daily x 10 days  -We discussed that his back may be the cause of pain.  However, we will address the arch pain and reassess in 2 weeks    Follow up: 2 weeks or sooner with acute issues      Patient's medical history was reviewed today      Ravindra Carrero DPM FACFAS FACFAOM  Podiatric Foot & Ankle Surgeon  [facility]  [phone]    Disclaimer: This note consists of symbols derived from keyboarding, dictation and/or voice recognition software. As a result, there may be errors in the script that have gone undetected. Please consider this when interpreting information found in this chart.          Again, thank you for allowing me to participate in the care of your patient.        Sincerely,        Ravindra Carrero DPM, VAL   Attending Only

## 2025-04-14 ENCOUNTER — HOSPITAL ENCOUNTER (EMERGENCY)
Facility: CLINIC | Age: 80
Discharge: HOME | End: 2025-04-14
Admitting: PHYSICIAN ASSISTANT
Payer: COMMERCIAL

## 2025-04-14 VITALS
HEART RATE: 69 BPM | DIASTOLIC BLOOD PRESSURE: 90 MMHG | SYSTOLIC BLOOD PRESSURE: 164 MMHG | TEMPERATURE: 97.7 F | RESPIRATION RATE: 16 BRPM | OXYGEN SATURATION: 97 %

## 2025-04-14 PROCEDURE — 99281 EMR DPT VST MAYX REQ PHY/QHP: CPT

## 2025-04-14 PROCEDURE — 250N000013 HC RX MED GY IP 250 OP 250 PS 637: Performed by: EMERGENCY MEDICINE

## 2025-04-14 RX ORDER — ACETAMINOPHEN 500 MG
1000 TABLET ORAL ONCE
Status: COMPLETED | OUTPATIENT
Start: 2025-04-14 | End: 2025-04-14

## 2025-04-14 RX ADMIN — ACETAMINOPHEN 1000 MG: 500 TABLET ORAL at 21:10

## 2025-04-14 ASSESSMENT — COLUMBIA-SUICIDE SEVERITY RATING SCALE - C-SSRS
1. IN THE PAST MONTH, HAVE YOU WISHED YOU WERE DEAD OR WISHED YOU COULD GO TO SLEEP AND NOT WAKE UP?: NO
2. HAVE YOU ACTUALLY HAD ANY THOUGHTS OF KILLING YOURSELF IN THE PAST MONTH?: NO
6. HAVE YOU EVER DONE ANYTHING, STARTED TO DO ANYTHING, OR PREPARED TO DO ANYTHING TO END YOUR LIFE?: NO

## 2025-04-14 ASSESSMENT — ACTIVITIES OF DAILY LIVING (ADL): ADLS_ACUITY_SCORE: 41

## 2025-04-15 NOTE — ED TRIAGE NOTES
Patient reports headache since last night. Patient states that the headache is intermittent. Patient requesting tylenol in triage. His wife is in the hospital so he asked the nurses for tylenol and they told him that he had to go to the ER for that.